# Patient Record
Sex: FEMALE | Race: WHITE | NOT HISPANIC OR LATINO
[De-identification: names, ages, dates, MRNs, and addresses within clinical notes are randomized per-mention and may not be internally consistent; named-entity substitution may affect disease eponyms.]

---

## 2023-03-01 ENCOUNTER — APPOINTMENT (OUTPATIENT)
Dept: ORTHOPEDIC SURGERY | Facility: CLINIC | Age: 88
End: 2023-03-01
Payer: MEDICARE

## 2023-03-01 VITALS
DIASTOLIC BLOOD PRESSURE: 78 MMHG | WEIGHT: 200 LBS | HEIGHT: 60 IN | BODY MASS INDEX: 39.27 KG/M2 | SYSTOLIC BLOOD PRESSURE: 165 MMHG

## 2023-03-01 PROCEDURE — 20610 DRAIN/INJ JOINT/BURSA W/O US: CPT | Mod: RT

## 2023-03-01 PROCEDURE — 99203 OFFICE O/P NEW LOW 30 MIN: CPT | Mod: 25

## 2023-05-03 ENCOUNTER — APPOINTMENT (OUTPATIENT)
Dept: ORTHOPEDIC SURGERY | Facility: CLINIC | Age: 88
End: 2023-05-03
Payer: MEDICARE

## 2023-05-03 VITALS
SYSTOLIC BLOOD PRESSURE: 168 MMHG | WEIGHT: 200 LBS | HEIGHT: 60 IN | BODY MASS INDEX: 39.27 KG/M2 | HEART RATE: 71 BPM | DIASTOLIC BLOOD PRESSURE: 85 MMHG

## 2023-05-03 PROCEDURE — 99213 OFFICE O/P EST LOW 20 MIN: CPT

## 2023-10-10 ENCOUNTER — APPOINTMENT (OUTPATIENT)
Dept: ORTHOPEDIC SURGERY | Facility: CLINIC | Age: 88
End: 2023-10-10
Payer: MEDICARE

## 2023-10-10 VITALS
HEIGHT: 60 IN | BODY MASS INDEX: 39.27 KG/M2 | HEART RATE: 106 BPM | SYSTOLIC BLOOD PRESSURE: 136 MMHG | DIASTOLIC BLOOD PRESSURE: 77 MMHG | WEIGHT: 200 LBS

## 2023-10-10 PROCEDURE — 99213 OFFICE O/P EST LOW 20 MIN: CPT | Mod: 25

## 2023-10-10 PROCEDURE — 20610 DRAIN/INJ JOINT/BURSA W/O US: CPT | Mod: RT

## 2024-02-26 ENCOUNTER — APPOINTMENT (OUTPATIENT)
Dept: ORTHOPEDIC SURGERY | Facility: CLINIC | Age: 89
End: 2024-02-26
Payer: MEDICARE

## 2024-02-26 VITALS
HEART RATE: 96 BPM | BODY MASS INDEX: 39.27 KG/M2 | DIASTOLIC BLOOD PRESSURE: 76 MMHG | HEIGHT: 60 IN | SYSTOLIC BLOOD PRESSURE: 154 MMHG | WEIGHT: 200 LBS

## 2024-02-26 PROCEDURE — 73562 X-RAY EXAM OF KNEE 3: CPT | Mod: RT

## 2024-02-26 PROCEDURE — 99213 OFFICE O/P EST LOW 20 MIN: CPT

## 2024-02-26 RX ORDER — DICLOFENAC SODIUM 1% 10 MG/G
1 GEL TOPICAL DAILY
Qty: 1 | Refills: 1 | Status: ACTIVE | COMMUNITY
Start: 2024-02-26 | End: 1900-01-01

## 2024-02-26 NOTE — PHYSICAL EXAM
[Normal] : Gait: normal [de-identified] : GENERAL APPEARANCE: Well nourished and hydrated, pleasant, alert, and oriented x 3. Appears their stated age.  HEENT: Normocephalic, extraocular eye motion intact. Nasal septum midline. Oral cavity clear. External auditory canal clear.  RESPIRATORY: Breath sounds clear and audible in all lobes. No wheezing, No accessory muscle use. CARDIOVASCULAR: No apparent abnormalities. No lower leg edema. No varicosities. Pedal pulses are palpable. NEUROLOGIC: Sensation is normal, no muscle weakness in the upper or lower extremities. DERMATOLOGIC: No apparent skin lesions, moist, warm, no rash. SPINE: Cervical spine appears normal and moves freely; thoracic spine appears normal and moves freely; lumbosacral spine appears normal and moves freely, normal, nontender. MUSCULOSKELETAL: Hands, wrists, and elbows are normal and move freely, shoulders are normal and move freely.  PSYCHIATRIC: Oriented to person, place, and time, insight and judgement were intact and the affect was normal. [de-identified] : Right knee exam shows a well-healed midline surgical incision, mild effusion, ROM is 0-120 degrees, no instability, negative Ke[place], no joint line tenderness, increased translation with anterior drawer of 4-5 mm. 5/5 motor strength in bilateral lower extremities. Sensory: Intact in bilateral lower extremities. DTRs: Biceps, brachioradialis, triceps, patellar, ankle and plantar 2+ and symmetric bilaterally. Pulses: dorsalis pedis, posterior tibial, femoral, popliteal, and radial 2+ and symmetric bilaterally. [de-identified] : 3 views of the right knee obtained the office today show no acute fracture or dislocation.  There is a right total knee arthroplasty in appropriate alignment without evidence of fracture dislocation or hardware complication

## 2024-02-26 NOTE — ADDENDUM
[FreeTextEntry1] : This note was written by Mary Ghosh, acting as the  for Dr. Granados. This note accurately reflects the work and decisions made by Dr. Granados.

## 2024-02-26 NOTE — REVIEW OF SYSTEMS
[Joint Pain] : joint pain [Joint Stiffness] : joint stiffness [Joint Swelling] : joint swelling [Negative] : Heme/Lymph [Decrease Hearing] : decrease hearing [FreeTextEntry9] : right knee and shin pain

## 2024-02-26 NOTE — HISTORY OF PRESENT ILLNESS
[Pain Location] : pain [] : right knee [___] : [unfilled] [Worsening] : worsening [___ mths] : [unfilled] month(s) ago [Standing] : standing [Constant] : ~He/She~ states the symptoms seem to be constant [Sitting] : worsened by sitting [Running] : worsened by running [Knee Flexion] : worsened with knee flexion [Walking] : worsened by walking [Knee Extension] : worsened with knee extension [de-identified] : 89 year old female presents today for an initial consultation for right knee and shin pain. The patient states she had her bilateral knees done at [facility] with Dr. Franz in 2009. She states the left knee surgery was great, but the right knee surgery caused her to be in the hospital for 2 months and she underwent 5 transfusions. She states the knee surgeries were done a day apart with the left knee surgery done first. She states the right knee feels cold. She states it gives out on occasion. She is afraid of falling because she had shattered her right rm and she can't use it to support herself. She states the pain and numbness/buckling has been ongoing for the past 6 months. The patient is hard of hearing. The patient states that surgical intervention is out of the question. She is having pain when walking. The patient ambulates without the use of an assistive device. [de-identified] : going up and down the stairs, rising from seated position

## 2024-02-26 NOTE — DISCUSSION/SUMMARY
[Medication Risks Reviewed] : Medication risks reviewed [Other: ____] : in [unfilled] [de-identified] : 89 year old female presents today for an initial consultation for right knee and shin pain.  I do think that some of the pain she is experiencing is likely coming some from some flexion instability of her knee.  She may also have a component of loosening.  However she is not interested in pursuing any type of surgical intervention and her x-rays show no evidence of gross loosening.  For that reason I recommended conservative treatment at this time.  We discussed low impact activity and exercise.  I recommended physical therapy.  I gave her prescription for diclofenac gel.  I will see her back in 6 weeks for repeat evaluation.  Possible advanced imaging if she continues to have pain.  All questions were asked and answered

## 2024-03-05 ENCOUNTER — APPOINTMENT (OUTPATIENT)
Dept: ORTHOPEDIC SURGERY | Facility: CLINIC | Age: 89
End: 2024-03-05
Payer: MEDICARE

## 2024-03-05 VITALS
SYSTOLIC BLOOD PRESSURE: 167 MMHG | WEIGHT: 200 LBS | BODY MASS INDEX: 39.27 KG/M2 | HEART RATE: 80 BPM | HEIGHT: 60 IN | DIASTOLIC BLOOD PRESSURE: 82 MMHG

## 2024-03-05 PROCEDURE — 99213 OFFICE O/P EST LOW 20 MIN: CPT | Mod: 25

## 2024-03-05 PROCEDURE — 20610 DRAIN/INJ JOINT/BURSA W/O US: CPT | Mod: RT

## 2024-04-29 ENCOUNTER — APPOINTMENT (OUTPATIENT)
Dept: ORTHOPEDIC SURGERY | Facility: CLINIC | Age: 89
End: 2024-04-29

## 2024-06-06 ENCOUNTER — APPOINTMENT (OUTPATIENT)
Dept: ORTHOPEDIC SURGERY | Facility: CLINIC | Age: 89
End: 2024-06-06
Payer: MEDICARE

## 2024-06-06 VITALS — WEIGHT: 200 LBS | BODY MASS INDEX: 40.32 KG/M2 | HEIGHT: 59 IN

## 2024-06-06 DIAGNOSIS — M19.90 UNSPECIFIED OSTEOARTHRITIS, UNSPECIFIED SITE: ICD-10-CM

## 2024-06-06 DIAGNOSIS — Z86.19 PERSONAL HISTORY OF OTHER INFECTIOUS AND PARASITIC DISEASES: ICD-10-CM

## 2024-06-06 DIAGNOSIS — T84.84XA PAIN DUE TO INTERNAL ORTHOPEDIC PROSTHETIC DEVICES, IMPLANTS AND GRAFTS, INITIAL ENCOUNTER: ICD-10-CM

## 2024-06-06 DIAGNOSIS — Z96.651 PAIN DUE TO INTERNAL ORTHOPEDIC PROSTHETIC DEVICES, IMPLANTS AND GRAFTS, INITIAL ENCOUNTER: ICD-10-CM

## 2024-06-06 DIAGNOSIS — C80.1 MALIGNANT (PRIMARY) NEOPLASM, UNSPECIFIED: ICD-10-CM

## 2024-06-06 DIAGNOSIS — Z78.9 OTHER SPECIFIED HEALTH STATUS: ICD-10-CM

## 2024-06-06 DIAGNOSIS — Z96.651 PRESENCE OF RIGHT ARTIFICIAL KNEE JOINT: ICD-10-CM

## 2024-06-06 PROCEDURE — 99204 OFFICE O/P NEW MOD 45 MIN: CPT

## 2024-06-06 PROCEDURE — L1833: CPT | Mod: KV,KX,RT

## 2024-06-06 RX ORDER — GABAPENTIN 100 MG/1
CAPSULE ORAL
Refills: 0 | Status: ACTIVE | COMMUNITY

## 2024-06-06 RX ORDER — ROSUVASTATIN CALCIUM 5 MG/1
TABLET, FILM COATED ORAL
Refills: 0 | Status: ACTIVE | COMMUNITY

## 2024-06-06 RX ORDER — ENALAPRIL MALEATE 5 MG/1
5 TABLET ORAL
Refills: 0 | Status: ACTIVE | COMMUNITY

## 2024-06-06 RX ORDER — ASPIRIN 81 MG
81 TABLET, DELAYED RELEASE (ENTERIC COATED) ORAL
Refills: 0 | Status: ACTIVE | COMMUNITY

## 2024-06-06 RX ORDER — DENOSUMAB 60 MG/ML
INJECTION SUBCUTANEOUS
Refills: 0 | Status: ACTIVE | COMMUNITY

## 2024-06-06 NOTE — HISTORY OF PRESENT ILLNESS
[Right Leg] : right leg [Gradual] : gradual [0] : 0 [Radiating] : radiating [Shooting] : shooting [Tingling] : tingling [Sleep] : sleep [Lying in bed] : lying in bed [] : yes [de-identified] : 6/6/24: 89yo F presents for eval of her right knee. Hx of bilateral TKA inn 2009 by Dr. Franz. States she had to stay in the hospital for 2 months and had 5 blood transfusions. States there is no issue of the left knee. She notes a "coldness" of the right knee and shin. Most recently seen by Dr. Granados for this issue, and had XR done. Was recommended PT, but she was unable to do it as she had gotten sick. Notes occasional buckling of the knee. Uses walker to ambulate.  [FreeTextEntry5] : gradual pain for about a year with no relief, no injury/ fall [FreeTextEntry6] : leg feels like its "freezing" at night [FreeTextEntry7] : rt leg  [FreeTextEntry9] : wrapping leg  [de-identified] : Dr. Granados [de-identified] : has dhruv done

## 2024-06-06 NOTE — ASSESSMENT
[FreeTextEntry1] : 89F p/w R TKA instability  HKB for stability defers PT discussed topical and oral agents for pain ambulate with walker return as needed

## 2024-06-07 ENCOUNTER — APPOINTMENT (OUTPATIENT)
Dept: ORTHOPEDIC SURGERY | Facility: CLINIC | Age: 89
End: 2024-06-07
Payer: MEDICARE

## 2024-06-07 VITALS — BODY MASS INDEX: 40.32 KG/M2 | WEIGHT: 200 LBS | HEIGHT: 59 IN

## 2024-06-07 DIAGNOSIS — M25.411 EFFUSION, RIGHT SHOULDER: ICD-10-CM

## 2024-06-07 PROCEDURE — 73010 X-RAY EXAM OF SHOULDER BLADE: CPT | Mod: RT

## 2024-06-07 PROCEDURE — 73030 X-RAY EXAM OF SHOULDER: CPT | Mod: RT

## 2024-06-07 PROCEDURE — 99204 OFFICE O/P NEW MOD 45 MIN: CPT

## 2024-06-07 NOTE — HISTORY OF PRESENT ILLNESS
[de-identified] : 89yo RHD F here for right shoulder pain that started February 20-23 after lifting 3 liter soda bottles repeatedly. She had MRI done. Saw outside ortho and tried CSI - most recent 3/6/24 which gave good relief for 1 month. Here for second opinion. No prior issues with the right shoulder.

## 2024-06-07 NOTE — PHYSICAL EXAM
[Right] : right shoulder [Sitting] : sitting [Moderate] : moderate [] : no sensory deficits [FreeTextEntry3] : There is a large effusion. [FreeTextEntry9] : L SHOULDER:  120/45.  There is no swelling. [de-identified] : Strength was not assessed. [TWNoteComboBox4] : passive forward flexion 45 degrees [de-identified] : external rotation 10 degrees

## 2024-06-07 NOTE — ASSESSMENT
[FreeTextEntry1] : We reviewed the findings and the history. Questions were answered and concerns addressed. The options were outlined. A RSA would be the surgical option. Pain controlling measures will be short term. Continued injections +/- aspirations are not likely to help. She cannot take NSAIDs. Botox or laser application have not been known to help.  Patient was seen by Dr. Alexandre Storm. Patient was seen by Sheridan MATTHEW under the supervision of Dr. Alexandre Storm. Progress note was completed by Sheridan MATTHEW.

## 2024-06-07 NOTE — IMAGING
[Right] : right shoulder [FreeTextEntry1] : There is bone on bone GH arthritis with spurring and bone loss.  There are AC changes. [FreeTextEntry5] : There is a Type II acromion.

## 2024-06-07 NOTE — REASON FOR VISIT
[Family Member] : family member [FreeTextEntry2] : This is a 89 year old RHD retired F with right shoulder pain since around 2020.  She had been doing heavy lifting prior.  She has treated with an outside orthopedist, including injections.  Her most recent was 3/6/24.  These help for a few weeks.  No f/c/s.  The arm has been more swollen.  She doesn't know exactly when this started.  Reaching and lifting are affected.  It is painful at night.  She can only take Tylenol secondary to a nephrectomy for cancer.  Her daughter is here.  She has an MRI from 2023.

## 2024-06-19 ENCOUNTER — APPOINTMENT (OUTPATIENT)
Dept: ORTHOPEDIC SURGERY | Facility: CLINIC | Age: 89
End: 2024-06-19
Payer: MEDICARE

## 2024-06-19 VITALS
DIASTOLIC BLOOD PRESSURE: 84 MMHG | BODY MASS INDEX: 40.32 KG/M2 | WEIGHT: 200 LBS | HEART RATE: 78 BPM | HEIGHT: 59 IN | SYSTOLIC BLOOD PRESSURE: 181 MMHG

## 2024-06-19 DIAGNOSIS — M12.819 OTHER SPECIFIC ARTHROPATHIES, NOT ELSEWHERE CLASSIFIED, UNSPECIFIED SHOULDER: ICD-10-CM

## 2024-06-19 PROCEDURE — 20610 DRAIN/INJ JOINT/BURSA W/O US: CPT | Mod: RT

## 2024-06-19 PROCEDURE — 99214 OFFICE O/P EST MOD 30 MIN: CPT | Mod: 25

## 2024-06-19 NOTE — PHYSICAL EXAM
[de-identified] : General: Awake, alert, no acute distress, Patient was cooperative and appropriate during the examination.  The patient is of normal weight for height and age.  Walks without an antalgic gait.  Right shoulder Exam: Physical exam of the shoulder demonstrates normal skin without signs of skin changes or abnormalities. No erythema, warmth, or joint effusion appreciated.  Sensation intact light touch C5-T1 Palpable radial pulse Radial/ulnar/median/axillary/musculocutaneous/AIN/PIN nerves grossly intact  Range of motion: Forward Flexion: 175 passively, 90 actively limited by pain Abduction: 140 passively, 90 actively limited by pain External Rotation: 45 Internal Rotation: L3  Palpation: Not tender to palpation over the glenohumeral joint Mildly tender palpation over the rotator cuff insertion on the greater tuberosity Not tender to palpation over the AC joint Mildly tender to palpation of the biceps tendon/bicipital groove  Strength testing: Supraspinatus: 4/5 Infraspinatus: 4+/5 Subscapularis: 5/5  Special test: Empty can test positive Rubio impingement test positive Speeds test negative Mapleton's test negative Lift-off test negative Bell-press test negative Cross-arm adduction test negative   [de-identified] : MRI of the right shoulder from  radiology on 2/16/2023 was reviewed today.  The patient has a massive rotator cuff tear with complete tearing of the supraspinatus tendon and full-thickness partial tearing of the infraspinatus tendon with retraction and fatty atrophy.  There are advanced glenohumeral osteoarthritic changes.  There is tearing of the labrum and complete tearing of the biceps.  There are arthritic changes in the AC joint as well

## 2024-06-19 NOTE — PROCEDURE
[de-identified] : I injected the patient's right shoulder today with cortisone.   I discussed at length with the patient the planned steroid and lidocaine injection. The risks, benefits, convalescence and alternatives were reviewed. The possible side effects discussed included but were not limited to: pain, swelling, heat, bleeding, and redness. Symptoms are generally mild but if they are extensive then contact the office. Giving pain relievers by mouth such as NSAIDs or Tylenol can generally treat the reactions to steroid and lidocaine. Rare cases of infection have been noted. Rash, hives and itching may occur post injection. If you have muscle pain or cramps, flushing and or swelling of the face, rapid heart beat, nausea, dizziness, fever, chills, headache, difficulty breathing, swelling in the arms or legs, or have a prickly feeling of your skin, contact a health care provider immediately. Following this discussion, the shoulder was prepped with Chlorhexidine and Alcohol and under sterile conditions the 80 mg Depo-Medrol and 4 cc Lidocaine injection was performed with a 22 gauge needle through a posterolateral injection site. The needle was introduced into the subacromial space and the medication was injected. Upon withdrawal of the needle the site was cleaned with alcohol and a band aid was applied. The patient tolerated the injection well and there were no adverse effects. Post injection instructions included no strenuous activity for 24 hours, cryotherapy and if there are any adverse effects to contact the office.

## 2024-06-19 NOTE — REASON FOR VISIT
[Spouse] : spouse [Initial Visit] : an initial visit for [Other: ____] : [unfilled] [FreeTextEntry2] : right shoulder pain

## 2024-06-19 NOTE — HISTORY OF PRESENT ILLNESS
[de-identified] : 6/19/2024: Bc Santana is a pleasant 89-year-old female who returns to the office today for follow-up evaluation of the right shoulder.  The patient states that at her last appointment she received a cortisone injection which provided her with good relief but only lasted a couple of months.  Her symptoms have since returned and she is requesting another injection.  She denies any fevers, chills, sweats, or pain elsewhere.  03/05/2024 : MARTITA HOWE  is a 89 year  old female who presents to the office for follow-up evaluation of the right shoulder.  She states that since last office visit she has continued waxing waning pain in the shoulder that is worse with certain activities and certain motions and on certain days when the weather is bad and alleviated with rest.  She states sometimes it bothers her to sleep.  She does some exercise at home on her own but has not done any physical therapy recently.  She would like to try some physical therapy now.  She denies any numbness or tingling distally.  She states the injections have helped in the past.  10/10/2023 : MARTITA HOWE  is a 89 year  old female who presents to the office for follow-up evaluation of the right shoulder.  She states that since the last office visit the symptoms have started to get worse again and she has more pain in the arm that is worse with certain activities and movements and worse with overhead positions and alleviated with rest.  She states she got good relief with the cortisone in the past and would like another one today.  She has no other complaints today.  She denies any new injury.  She denies any numbness or tingling distally.  05/03/2023 : MARTITA HOWE  is a 89 year  old female who presents to the office for follow-up evaluation of the right shoulder.  She states that since the last office visit and since the last cortisone injection she has had improvement in her pain and symptoms.  She has been doing exercises at home on her own which she feels helps.  She states she does still have pain in the shoulder that is worse when she does too much and overdoes it with pain into the anterior aspect of the shoulder.  She states in general she is doing better after the injection.  She denies any fevers, chills, chest pain, shortness of breath.  She denies any numbness or tingling distally.  3/1/2023: Martita is a pleasant 89-year-old right-hand-dominant female presents to the office today complaining of right shoulder pain.  The patient states that her pain began about a year ago and is gotten progressively worse over the past month or 2.  She denies any history of trauma.  The pain is activity related and alleviated by rest.  Hurts to reach overhead or behind her.  She states she sleeps reasonably well at night.  She has not had any formal treatment for this yet.  She denies any fevers, chills, sweats , drainage from nose or tingling, or pain elsewhere.

## 2024-06-19 NOTE — DISCUSSION/SUMMARY
[de-identified] : Assessment: 89-year-old female with right shoulder pain secondary to rotator cuff arthropathy  Plan: I had a long discussion with the patient today regarding the nature of their diagnosis and treatment plan. We discussed the risks and benefits of no treatment as well as nonoperative and operative treatments.  I reviewed the patient's MRI today with her in the office which demonstrates evidence of rotator cuff arthropathy.  On examination she has reasonably well-preserved strength and motion.  At this time I recommend continued conservative treatment of the patient's condition with modalities including rest, ice, heat, over-the-counter medications, activity modifications, and home stretching and strengthening exercises daily.  she will take Tylenol as needed for pain as she is unable to take oral anti-inflammatories.  GI precautions were discussed she was given a physical therapy prescription today 18 will assist with pain and function and motion.  I advised her to avoid exacerbating activities recommend a repeat cortisone injection of the right shoulder be administered in the office today.  She tolerated the procedure well with no adverse effects..  I advised her that we could repeat a cortisone injection in the future if symptoms were to return despite continued conservative treatment however they have to be at least 3 months apart with a maximum of 3 in a single calendar year.  She will follow-up in 3 months as needed.  The patient verbalizes their understanding and agrees with the plan.  All questions were answered to their satisfaction.

## 2024-09-10 ENCOUNTER — APPOINTMENT (OUTPATIENT)
Dept: ORTHOPEDIC SURGERY | Facility: CLINIC | Age: 89
End: 2024-09-10
Payer: MEDICARE

## 2024-09-10 VITALS
HEART RATE: 85 BPM | SYSTOLIC BLOOD PRESSURE: 142 MMHG | HEIGHT: 59 IN | WEIGHT: 200 LBS | BODY MASS INDEX: 40.32 KG/M2 | DIASTOLIC BLOOD PRESSURE: 78 MMHG

## 2024-09-10 DIAGNOSIS — M25.411 EFFUSION, RIGHT SHOULDER: ICD-10-CM

## 2024-09-10 DIAGNOSIS — M12.819 OTHER SPECIFIC ARTHROPATHIES, NOT ELSEWHERE CLASSIFIED, UNSPECIFIED SHOULDER: ICD-10-CM

## 2024-09-10 PROCEDURE — 99213 OFFICE O/P EST LOW 20 MIN: CPT | Mod: 25

## 2024-09-10 PROCEDURE — 20610 DRAIN/INJ JOINT/BURSA W/O US: CPT | Mod: RT

## 2024-09-10 NOTE — PHYSICAL EXAM
[de-identified] : General: Awake, alert, no acute distress, Patient was cooperative and appropriate during the examination.  The patient is of normal weight for height and age.  Walks without an antalgic gait.  Right shoulder Exam: Physical exam of the shoulder demonstrates normal skin without signs of skin changes or abnormalities. No erythema, warmth, with a moderate effusion noted and no evidence of infection.  Sensation intact light touch C5-T1 Palpable radial pulse Radial/ulnar/median/axillary/musculocutaneous/AIN/PIN nerves grossly intact  Range of motion: Forward Flexion: 175 passively, 90 actively limited by pain Abduction: 140 passively, 90 actively limited by pain External Rotation: 45 Internal Rotation: L3  Palpation: Not tender to palpation over the glenohumeral joint Mildly tender palpation over the rotator cuff insertion on the greater tuberosity Not tender to palpation over the AC joint Mildly tender to palpation of the biceps tendon/bicipital groove  Strength testing: Supraspinatus: 4/5 Infraspinatus: 4+/5 Subscapularis: 5/5  Special test: Empty can test positive Rubio impingement test positive Speeds test negative Susan's test negative Lift-off test negative Bell-press test negative Cross-arm adduction test negative   [de-identified] : MRI of the right shoulder from  radiology on 2/16/2023 was reviewed today.  The patient has a massive rotator cuff tear with complete tearing of the supraspinatus tendon and full-thickness partial tearing of the infraspinatus tendon with retraction and fatty atrophy.  There are advanced glenohumeral osteoarthritic changes.  There is tearing of the labrum and complete tearing of the biceps.  There are arthritic changes in the AC joint as well

## 2024-09-10 NOTE — DISCUSSION/SUMMARY
[de-identified] : Assessment: 89-year-old female with right shoulder pain secondary to rotator cuff arthropathy, effusion  Plan: I had a long discussion with the patient today regarding the nature of their diagnosis and treatment plan. We discussed the risks and benefits of no treatment as well as nonoperative and operative treatments.  I reviewed the patient's MRI today with her in the office which demonstrates evidence of rotator cuff arthropathy.  On examination she has reasonably well-preserved strength and motion.  At this time I recommend continued conservative treatment of the patient's condition with modalities including rest, ice, heat, over-the-counter medications, activity modifications, and home stretching and strengthening exercises daily.  she will take Tylenol as needed for pain as she is unable to take oral anti-inflammatories.  GI precautions were discussed she was given a physical therapy prescription today 18 will assist with pain and function and motion.  I advised her to avoid exacerbating activities recommend a repeat cortisone injection of the right shoulder be administered in the office today.  She tolerated the procedure well with no adverse effects..  I advised her that we could repeat a cortisone injection in the future if symptoms were to return despite continued conservative treatment however they have to be at least 3 months apart with a maximum of 3 in a single calendar year.  She will follow-up in 3 months as needed.  The patient verbalizes their understanding and agrees with the plan.  All questions were answered to their satisfaction.

## 2024-09-10 NOTE — PROCEDURE
[de-identified] : I performed a right shoulder aspiration and cortisone injection.   I discussed at length with the patient the planned steroid and lidocaine injection. The risks, benefits, convalescence and alternatives were reviewed. The possible side effects discussed included but were not limited to: pain, swelling, heat, bleeding, and redness. Symptoms are generally mild but if they are extensive then contact the office. Giving pain relievers by mouth such as NSAIDs or Tylenol can generally treat the reactions to steroid and lidocaine. Rare cases of infection have been noted. Rash, hives and itching may occur post injection. If you have muscle pain or cramps, flushing and or swelling of the face, rapid heart beat, nausea, dizziness, fever, chills, headache, difficulty breathing, swelling in the arms or legs, or have a prickly feeling of your skin, contact a health care provider immediately. Following this discussion, the shoulder was prepped with Chlorhexidine and Alcohol and under sterile conditions the 80 mg Depo-Medrol and 4 cc Lidocaine injection was performed with a 22 gauge needle through a posterolateral injection site. The needle was introduced into the subacromial space and the medication was injected but not before 67 cc's of clear yellow synovial fluid were aspirated from the shoulder. Upon withdrawal of the needle the site was cleaned with alcohol and a band aid was applied. The patient tolerated the injection well and there were no adverse effects. Post injection instructions included no strenuous activity for 24 hours, cryotherapy and if there are any adverse effects to contact the office.

## 2024-09-10 NOTE — REASON FOR VISIT
[Other: ____] : [unfilled] [Initial Visit] : an initial visit for [Spouse] : spouse [FreeTextEntry2] : right shoulder pain

## 2024-09-10 NOTE — HISTORY OF PRESENT ILLNESS
[de-identified] : 09/10/2024 : MARTITA HOWE  is a 89 year  old female who presents to the office for follow-up evaluation of her right shoulder.  She states that the cortisone injection given at the last office visit did give some relief but it took a while to kick in.  She states recently the pain and swelling is getting worse and the swelling wax and wanes in severity.  She is here for repeat evaluation to discuss options.  She is not interested in surgery.  She has no other complaints today.  6/19/2024: Bc Santana is a pleasant 89-year-old female who returns to the office today for follow-up evaluation of the right shoulder.  The patient states that at her last appointment she received a cortisone injection which provided her with good relief but only lasted a couple of months.  Her symptoms have since returned and she is requesting another injection.  She denies any fevers, chills, sweats, or pain elsewhere.  03/05/2024 : MARTITA HOWE  is a 89 year  old female who presents to the office for follow-up evaluation of the right shoulder.  She states that since last office visit she has continued waxing waning pain in the shoulder that is worse with certain activities and certain motions and on certain days when the weather is bad and alleviated with rest.  She states sometimes it bothers her to sleep.  She does some exercise at home on her own but has not done any physical therapy recently.  She would like to try some physical therapy now.  She denies any numbness or tingling distally.  She states the injections have helped in the past.  10/10/2023 : MARTITA HOWE  is a 89 year  old female who presents to the office for follow-up evaluation of the right shoulder.  She states that since the last office visit the symptoms have started to get worse again and she has more pain in the arm that is worse with certain activities and movements and worse with overhead positions and alleviated with rest.  She states she got good relief with the cortisone in the past and would like another one today.  She has no other complaints today.  She denies any new injury.  She denies any numbness or tingling distally.  05/03/2023 : MARTITA HOWE  is a 89 year  old female who presents to the office for follow-up evaluation of the right shoulder.  She states that since the last office visit and since the last cortisone injection she has had improvement in her pain and symptoms.  She has been doing exercises at home on her own which she feels helps.  She states she does still have pain in the shoulder that is worse when she does too much and overdoes it with pain into the anterior aspect of the shoulder.  She states in general she is doing better after the injection.  She denies any fevers, chills, chest pain, shortness of breath.  She denies any numbness or tingling distally.  3/1/2023: Martita is a pleasant 89-year-old right-hand-dominant female presents to the office today complaining of right shoulder pain.  The patient states that her pain began about a year ago and is gotten progressively worse over the past month or 2.  She denies any history of trauma.  The pain is activity related and alleviated by rest.  Hurts to reach overhead or behind her.  She states she sleeps reasonably well at night.  She has not had any formal treatment for this yet.  She denies any fevers, chills, sweats , drainage from nose or tingling, or pain elsewhere.

## 2024-10-01 ENCOUNTER — TRANSCRIPTION ENCOUNTER (OUTPATIENT)
Age: 89
End: 2024-10-01